# Patient Record
Sex: FEMALE | Race: BLACK OR AFRICAN AMERICAN
[De-identification: names, ages, dates, MRNs, and addresses within clinical notes are randomized per-mention and may not be internally consistent; named-entity substitution may affect disease eponyms.]

---
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
## 2020-01-02 ENCOUNTER — HOSPITAL ENCOUNTER (EMERGENCY)
Dept: HOSPITAL 62 - ER | Age: 20
Discharge: HOME | End: 2020-01-02
Payer: MEDICAID

## 2020-01-02 VITALS — DIASTOLIC BLOOD PRESSURE: 92 MMHG | SYSTOLIC BLOOD PRESSURE: 147 MMHG

## 2020-01-02 DIAGNOSIS — E11.9: ICD-10-CM

## 2020-01-02 DIAGNOSIS — M25.562: Primary | ICD-10-CM

## 2020-01-02 PROCEDURE — 73564 X-RAY EXAM KNEE 4 OR MORE: CPT

## 2020-01-02 PROCEDURE — 99283 EMERGENCY DEPT VISIT LOW MDM: CPT

## 2020-01-02 PROCEDURE — L1830 KO IMMOB CANVAS LONG PRE OTS: HCPCS

## 2020-01-02 NOTE — RADIOLOGY REPORT (SQ)
EXAM DESCRIPTION:  KNEE LEFT 4 VIEW



COMPLETED DATE/TIME:  1/2/2020 3:12 pm



REASON FOR STUDY:  left knee pain s/p injury



COMPARISON:  None.



NUMBER OF VIEWS:  Four views.



TECHNIQUE:  AP, lateral, and both oblique radiographic images acquired of the left knee.



LIMITATIONS:  None.



FINDINGS:  MINERALIZATION: Normal.

BONES: No acute fracture or dislocation.  No worrisome bone lesions.

JOINT: No effusion.

SOFT TISSUES: No soft tissue swelling.  No radio-opaque foreign body.

OTHER: No other significant finding.



IMPRESSION:  NEGATIVE STUDY OF THE LEFT KNEE. NO RADIOGRAPHIC EVIDENCE OF ACUTE INJURY.



TECHNICAL DOCUMENTATION:  JOB ID:  [id number]

 2011 [employer]- All Rights Reserved



Reading location - IP/workstation name: [email or web address]

## 2020-01-02 NOTE — ER DOCUMENT REPORT
HPI





- HPI


Time Seen by Provider: 01/02/20 14:50


Notes: 





Patient is a 19-year-old female no significant past medical history aside from 

diabetes who presents complaining of left knee pain status post possible twist 

injury prior to arrival.  Patient states that she heard a pop and has had pain 

with ambulation & weightbearing since then.  Pain does not radiate.  She has not

noticed any swelling or bruising.  Denies drug allergies.  Denies any headache, 

fever, head injury, neck pain, URI, sore throat, chest pain, palpitations, 

syncope, cough, shortness of breath, wheeze, dyspnea, abdominal pain, 

nausea/vomiting/diarrhea, urinary retention, dysuria, hematuria, loss of control

of bowel or bladder, numbness/tingling, saddle anesthesia, muscle 

paralysis/weakness, or rash.





- ROS


Systems Reviewed and Negative: Yes All other systems reviewed and negative





- REPRODUCTIVE


Reproductive: DENIES: Pregnant:





Past Medical History





- Social History


Smoking Status: Unknown if Ever Smoked


Family History: None


Endocrine Medical History: Reports: Hx Diabetes Mellitus Type 2


Renal/ Medical History: Denies: Hx Peritoneal Dialysis





- Immunizations


Immunizations up to date: Yes





Vertical Provider Document





- CONSTITUTIONAL


Agree With Documented VS: Yes


Notes: 





PHYSICAL EXAMINATION:





GENERAL: Well-appearing, well-nourished and in no acute distress.





LUNGS: Breath sounds clear to auscultation bilaterally and equal.  No wheezes 

rales or rhonchi.





HEART: Regular rate and rhythm without murmurs, rubs, gallops.





Musculoskeletal: Lt knee:  No obvious swelling, ecchymosis, effusion, or 

deformity.  FROM to passive/active. Strength 5+/5. N/V intact distal.  + 

tenderness anterior knee. Too difficult to adequately assess ligaments due to 

patient leg size and resistance.  Radha grossly negative.  Patellar grind 

negative.  No calf tenderness.





Extremities:  No cyanosis, clubbing, or edema b/l.  Peripheral pulses 2+.  

Capillary refill less than 3 seconds.  Dena neg b/l.





NEUROLOGICAL: Normal speech, normal gait.  Normal sensory, motor exams 





PSYCH: Normal mood, normal affect.





SKIN: Warm, Dry, normal turgor, no rashes or lesions noted.





- INFECTION CONTROL


TRAVEL OUTSIDE OF THE [place] IN LAST 30 DAYS: No





Course





- Re-evaluation


Re-evalutation: 





01/02/20 


Patient is an afebrile, well-hydrated, 19-year-old female who presents to the ED

with left knee pain which I suspect to be a sprain versus strain.  Vitals are 

acceptable without any significant tachycardia, tachypnea, or hypoxia.  PE is 

otherwise unremarkable for any neurovascular compromise, obvious tendon/ligament

rupture, obvious fracture/dislocation, septic joint.  X-ray was unremarkable for

any acute pathology.  Knee immobilizer and crutches were provided today.  Motrin

given PO.   Patient is nontoxic-appearing.  Patient is able to ambulate and 

weight-bear although she is limping.  No other labs or imaging warranted at this

time based on H&P.  Conservative measures otherwise for symptoms.  Recheck with 

your PCM in 3-5 days.  Schedule consult with orthopedics.  Return to the ED with

any worsening/concerning symptoms otherwise as reviewed in discharge.  Patient 

is in agreement.





- Vital Signs


Vital signs: 


                                        











Temp Pulse Resp BP Pulse Ox


 


 98.5 F   97 H  18   147/92 H  97 


 


 01/02/20 14:44  01/02/20 14:44  01/02/20 14:44  01/02/20 14:44  01/02/20 14:44














Discharge





- Discharge


Clinical Impression: 


Left knee pain


Qualifiers:


 Chronicity: acute Qualified Code(s): M25.562 - Pain in left knee





Condition: Stable


Disposition: HOME, SELF-CARE


Additional Instructions: 


Rest, Ice, Compression, Elevation


Use crutches/splint as directed


Tylenol/ibuprofen as needed


Light stretches daily


Strength exercises as able


Moist heat and massage may help


F/u with your PCP in 3-5 days for a recheck


Schedule consult with orthopedics for further evaluation and management





Return to the ED with any worsening symptoms and/or development of fever, 

headache, chest pain, palpitations, syncope, shortness of breath, trouble 

breathing, abdominal pain, n/v/d, muscle weakness/paralysis, numbness/tingling, 

swelling, redness, or other worsening symptoms that are concerning to you.


Prescriptions: 


Ibuprofen [Motrin 800 mg Tablet] 800 mg PO Q8H PRN #15 tab


 PRN Reason: 


Forms:  Elevated Blood Pressure


Referrals: 


LEVON GALAN MD [Primary Care Provider] - Follow up as needed


[facility] [Provider Group] - Follow up as needed

## 2020-02-20 ENCOUNTER — HOSPITAL ENCOUNTER (EMERGENCY)
Dept: HOSPITAL 62 - ER | Age: 20
Discharge: HOME | End: 2020-02-20
Payer: COMMERCIAL

## 2020-02-20 VITALS — DIASTOLIC BLOOD PRESSURE: 104 MMHG | SYSTOLIC BLOOD PRESSURE: 157 MMHG

## 2020-02-20 DIAGNOSIS — M25.562: Primary | ICD-10-CM

## 2020-02-20 DIAGNOSIS — E11.9: ICD-10-CM
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
